# Patient Record
Sex: FEMALE
[De-identification: names, ages, dates, MRNs, and addresses within clinical notes are randomized per-mention and may not be internally consistent; named-entity substitution may affect disease eponyms.]

---

## 2019-02-22 ENCOUNTER — HOSPITAL ENCOUNTER (EMERGENCY)
Dept: HOSPITAL 14 - H.ER | Age: 29
Discharge: HOME | End: 2019-02-22
Payer: MEDICAID

## 2019-02-22 VITALS
HEART RATE: 92 BPM | TEMPERATURE: 99.2 F | OXYGEN SATURATION: 98 % | RESPIRATION RATE: 20 BRPM | SYSTOLIC BLOOD PRESSURE: 100 MMHG | DIASTOLIC BLOOD PRESSURE: 65 MMHG

## 2019-02-22 VITALS — BODY MASS INDEX: 21.7 KG/M2

## 2019-02-22 DIAGNOSIS — J32.9: ICD-10-CM

## 2019-02-22 DIAGNOSIS — J06.9: Primary | ICD-10-CM

## 2019-02-22 NOTE — ED PDOC
HPI: General Adult


Time Seen by Provider: 02/22/19 04:18


Chief Complaint (Nursing): ENT Problem


Chief Complaint (Provider): ENT Problem


History Per: Patient


History/Exam Limitations: no limitations


Onset/Duration Of Symptoms: Days (x2)


Additional Complaint(s): 





28 years old Spaniard female with no significant PMHx presents to ER for 

evaluation of right sided earache and facial pain onset 2 days ago. Patient 

reports associated fever, chills and nasal congestion. She states she has been 

taking Tylenol with relief. Patient denies any cough, shortness of breath, 

nausea, vomiting and diarrhea.





PMD: None provided





Past Medical History


Reviewed: Historical Data, Nursing Documentation, Vital Signs


Vital Signs: 





                                Last Vital Signs











Temp  99.2 F   02/22/19 04:03


 


Pulse  92 H  02/22/19 04:03


 


Resp  20   02/22/19 04:03


 


BP  100/65   02/22/19 04:03


 


Pulse Ox  98   02/22/19 04:03














- Medical History


PMH: No Chronic Diseases





- Surgical History


Surgical History: No Surg Hx





- Family History


Family History: States: Unknown Family Hx





- Social History


Current smoker - smoking cessation education provided: No


Alcohol: None


Drugs: Denies





- Home Medications


Home Medications: 


                                Ambulatory Orders











 Medication  Instructions  Recorded


 


Azithromycin [Zithromax] 250 mg PO QAM #1 pkg 02/22/19














- Allergies


Allergies/Adverse Reactions: 


                                    Allergies











Allergy/AdvReac Type Severity Reaction Status Date / Time


 


shellfish derived Allergy  ITCHING Verified 02/22/19 04:18














Review of Systems


ROS Statement: Except As Marked, All Systems Reviewed And Found Negative


Constitutional: Positive for: Fever, Chills, Other (Right facial pain)


ENT: Positive for: Ear Pain (Right sided), Nose Congestion


Respiratory: Negative for: Cough, Shortness of Breath


Gastrointestinal: Negative for: Nausea, Vomiting, Diarrhea





Physical Exam





- Reviewed


Nursing Documentation Reviewed: Yes


Vital Signs Reviewed: Yes





- Physical Exam


Appears: Positive for: Well, No Acute Distress


Head Exam: Positive for: ATRAUMATIC, NORMOCEPHALIC


Skin: Positive for: Normal Color, Warm, Dry


Eye Exam: Positive for: Normal appearance, EOMI, PERRL


ENT: Positive for: Normal ENT Inspection


Neck: Positive for: Normal, Painless ROM, Supple


Cardiovascular/Chest: Positive for: Regular Rate, Rhythm.  Negative for: Murmur


Respiratory: Positive for: Normal Breath Sounds.  Negative for: Respiratory 

Distress


Gastrointestinal/Abdominal: Positive for: Normal Exam, Soft.  Negative for: 

Tenderness


Back: Positive for: Normal Inspection.  Negative for: L CVA Tenderness, R CVA 

Tenderness


Extremity: Positive for: Normal ROM.  Negative for: Pedal Edema, Swelling


Neurologic/Psych: Positive for: Alert, Oriented (x3)





- ECG


O2 Sat by Pulse Oximetry: 98 (RA)


Pulse Ox Interpretation: Normal





Medical Decision Making


Medical Decision Making: 





Time: 0431


Initial Impression: 29 y/o female with earache


Initial Plan:


--Influenza A B


--Rapid Strep Group A Antigen





0539


Flu and strep negative. Patient is stable for discharge with a prescription of 

Zithromax with precaution to take only if symptoms did not improve in 24-48 

hours. Patient verbalized understanding.


Diagnosis: sinusitis.


____________________________________________________________


Scribe Attestation:


Documented by Alysa Moore, acting as a scribe for Kevin Burton MD.





Provider Scribe Attestation:


All medical record entries made by the Scribe were at my direction and 

personally dictated by me. I have reviewed the chart and agree that the record 

accurately reflects my personal performance of the history, physical exam, 

medical decision making, and the department course for this patient. I have also

personally directed, reviewed, and agree with the discharge instructions and 

disposition.





Disposition





- Clinical Impression


Clinical Impression: 


 URI (upper respiratory infection), Sinusitis








- Patient ED Disposition


Is Patient to be Admitted: No





- Disposition


Disposition: Routine/Home


Disposition Time: 05:39


Condition: STABLE


Prescriptions: 


Azithromycin [Zithromax] 250 mg PO QAM #1 pkg


Instructions:  Viral Upper Respiratory Infection, Adult (DC)


Forms:  [facility] (English)

## 2019-02-26 ENCOUNTER — HOSPITAL ENCOUNTER (EMERGENCY)
Dept: HOSPITAL 14 - H.ER | Age: 29
Discharge: HOME | End: 2019-02-26
Payer: MEDICAID

## 2019-02-26 VITALS
SYSTOLIC BLOOD PRESSURE: 121 MMHG | HEART RATE: 86 BPM | OXYGEN SATURATION: 100 % | RESPIRATION RATE: 20 BRPM | DIASTOLIC BLOOD PRESSURE: 86 MMHG | TEMPERATURE: 97.7 F

## 2019-02-26 VITALS — BODY MASS INDEX: 21.7 KG/M2

## 2019-02-26 DIAGNOSIS — J32.9: Primary | ICD-10-CM

## 2019-02-26 NOTE — ED PDOC
HPI:  Headache


Time Seen by Provider: 19 04:35


Chief Complaint (Nursing): Headache


Chief Complaint (Provider): ENT problem


History Per: Patient


History/Exam Limitations: no limitations


Onset/Duration Of Symptoms: Days (1x week)


Current Symptoms Are (Timing): Still Present


Severity: Moderate


Additional Complaint(s): 





28 year old female with no past medical history presents to the ED for an 

evaluation of facial pressure, headaches, and nasal congestion ongoing for less 

than 1x week. Less than 1x week ago, patient was diagnosed with sinusitis and 

prescribed antibiotics, which she did not take. Patient states that she started 

to feel better, but last night she developed severe facial pain which prevented 

her from sleeping, prompting ED visit. Patient reports taking tylenol prior to 

arrival, which made her start to feel better. Patient denies having fevers, and 

neck stiffness.





PMD: None





Past Medical History


Reviewed: Historical Data, Nursing Documentation, Vital Signs


Vital Signs: 





                                Last Vital Signs











Temp  97.7 F   19 04:20


 


Pulse  86   19 04:20


 


Resp  20   19 04:20


 


BP  121/86   19 04:20


 


Pulse Ox  100   19 04:20











CARMEN Report Viewed: Yes





- Medical History


PMH: No Chronic Diseases





- Family History


Family History: States: No Known Family Hx





- Social History


Current smoker - smoking cessation education provided: No


Alcohol: None


Drugs: Denies





- Home Medications


Home Medications: 


                                Ambulatory Orders











 Medication  Instructions  Recorded


 


Azithromycin [Zithromax] 250 mg PO QAM #1 pkg 19


 


Fluticasone Propionate [Flonase] 1 spr NS DAILY #1 bottle 19


 


Ibuprofen [Motrin Tab] 600 mg PO Q6 #30 tab 19


 


Prednisone [Deltasone] 40 mg PO DAILY 3 Days #6 tablet 19














- Allergies


Allergies/Adverse Reactions: 


                                    Allergies











Allergy/AdvReac Type Severity Reaction Status Date / Time


 


shellfish derived Allergy  ITCHING Verified 19 04:18














Review of Systems


ROS Statement: Except As Marked, All Systems Reviewed And Found Negative


Constitutional: Negative for: Fever


ENT: Positive for: Nose Congestion, Other (facial pressure)


Musculoskeletal: Negative for: Neck Pain ((-) neck stiffness)


Neurological: Positive for: Headache





Physical Exam





- Reviewed


Nursing Documentation Reviewed: Yes


Vital Signs Reviewed: Yes





- Physical Exam


Appears: Positive for: Well, Non-toxic, No Acute Distress


Head Exam: Positive for: ATRAUMATIC, NORMOCEPHALIC


Skin: Positive for: Normal Color, Warm, Dry


Eye Exam: Positive for: Normal appearance


ENT: Positive for: Other (maxillary and ethmoid sinus pressure)


Neck: Positive for: Normal, Supple


Cardiovascular/Chest: Positive for: Regular Rate, Rhythm


Respiratory: Positive for: Normal Breath Sounds


Extremity: Positive for: Normal ROM


Neurologic/Psych: Positive for: Alert, CNs II-XII, Oriented (3x).  Negative for:

 Motor/Sensory Deficits





- ECG


O2 Sat by Pulse Oximetry: 100 (RA)


Pulse Ox Interpretation: Normal





Medical Decision Making


Medical Decision Makin:35


Initial impression: 28 year old well appearing female with sinusitis. Encouraged

 patient to use NSAIDs, steroids, and flonase. Advised patient not to breastfeed

 4x hours after taking prednisone. Patient is stable for discharge and follow up

 with Dr.Behin.  Patient very well appearing with normal vitals


Initial plan:


* motrin tab 600 mg PO


* prednisone tab 40 mg PO





------------------------------------

-------------------------------------------------------------


Scribe Attestation:


Documented Dillon Ray, acting as a scribe for Chance Ladd MD.





Provider Scribe Attestation:


All medical record entries made by the Scribe were at my direction and 

personally dictated by me. I have reviewed the chart and agree that the record 

accurately reflects my personal performance of the history, physical exam, 

medical decision making, and the department course for this patient. I have also

 personally directed, reviewed, and agree with the discharge instructions and 

disposition.





Disposition





- Clinical Impression


Clinical Impression: 


 Sinusitis








- Disposition


Referrals: 


Behin,Babak, MD [Staff Provider] - 


Disposition: Routine/Home


Disposition Time: 04:35


Condition: GOOD


Prescriptions: 


Fluticasone Propionate [Flonase] 1 spr NS DAILY #1 bottle


Ibuprofen [Motrin Tab] 600 mg PO Q6 #30 tab


Prednisone [Deltasone] 40 mg PO DAILY 3 Days #6 tablet


Instructions:  Sinusitis in Adults


Forms:  [facility] (English)